# Patient Record
Sex: FEMALE
[De-identification: names, ages, dates, MRNs, and addresses within clinical notes are randomized per-mention and may not be internally consistent; named-entity substitution may affect disease eponyms.]

---

## 2019-10-24 ENCOUNTER — HOSPITAL ENCOUNTER (OUTPATIENT)
Dept: HOSPITAL 5 - US | Age: 34
Discharge: HOME | End: 2019-10-24
Attending: OBSTETRICS & GYNECOLOGY
Payer: COMMERCIAL

## 2019-10-24 DIAGNOSIS — E03.9: ICD-10-CM

## 2019-10-24 DIAGNOSIS — R10.819: ICD-10-CM

## 2019-10-24 DIAGNOSIS — R10.9: ICD-10-CM

## 2019-10-24 DIAGNOSIS — D75.9: Primary | ICD-10-CM

## 2019-10-24 DIAGNOSIS — R19.09: ICD-10-CM

## 2019-10-24 PROCEDURE — 76705 ECHO EXAM OF ABDOMEN: CPT

## 2019-10-24 NOTE — ULTRASOUND REPORT
BILATERAL GROIN ULTRASOUND / US extremity nonvascular RT



INDICATION:  lump in groin. Bilateral groin lumps for over one year with no change in size per patien
t.



COMPARISON: None.



FINDINGS:  Grayscale and color flow sonographic evaluation of the area of concern demonstrates few sm
all, sonographically unremarkable lymph nodes, measuring up to approximately 2 x 0.6 cm on the right,
 image 3 and up to 1.5 x 0.5 cm on the left, image 35. No tenderness elicited by the sonographer.  No
 focal suspicious masses or fluid collections.



IMPRESSION: No significant sonographic bilateral groin abnormality with few small lymph nodes inciden
tally noted, as described. Please correlate.



Thank you for the opportunity to participate in this patient's care.



Signer Name: Davis Thornton 

Signed: 10/24/2019 11:06 AM

 Workstation Name: [id number]